# Patient Record
Sex: FEMALE | Race: WHITE | NOT HISPANIC OR LATINO | URBAN - METROPOLITAN AREA
[De-identification: names, ages, dates, MRNs, and addresses within clinical notes are randomized per-mention and may not be internally consistent; named-entity substitution may affect disease eponyms.]

---

## 2023-03-22 ENCOUNTER — OFFICE VISIT (OUTPATIENT)
Dept: FAMILY MEDICINE CLINIC | Facility: CLINIC | Age: 28
End: 2023-03-22
Payer: COMMERCIAL

## 2023-03-22 VITALS
SYSTOLIC BLOOD PRESSURE: 90 MMHG | HEIGHT: 67 IN | BODY MASS INDEX: 34.06 KG/M2 | WEIGHT: 217 LBS | TEMPERATURE: 97.1 F | DIASTOLIC BLOOD PRESSURE: 64 MMHG | HEART RATE: 116 BPM | OXYGEN SATURATION: 99 %

## 2023-03-22 DIAGNOSIS — F32.A ANXIETY AND DEPRESSION: ICD-10-CM

## 2023-03-22 DIAGNOSIS — Z13.6 SCREENING FOR ISCHEMIC HEART DISEASE: ICD-10-CM

## 2023-03-22 DIAGNOSIS — E55.9 VITAMIN D DEFICIENCY: ICD-10-CM

## 2023-03-22 DIAGNOSIS — F41.9 ANXIETY AND DEPRESSION: ICD-10-CM

## 2023-03-22 DIAGNOSIS — Z00.00 ENCOUNTER FOR HEALTH MAINTENANCE EXAMINATION IN ADULT: Primary | ICD-10-CM

## 2023-03-22 DIAGNOSIS — E03.9 HYPOTHYROIDISM, UNSPECIFIED TYPE: ICD-10-CM

## 2023-03-22 DIAGNOSIS — R53.83 OTHER FATIGUE: ICD-10-CM

## 2023-03-22 RX ORDER — CETIRIZINE HYDROCHLORIDE, PSEUDOEPHEDRINE HYDROCHLORIDE 5; 120 MG/1; MG/1
1 TABLET, FILM COATED, EXTENDED RELEASE ORAL
COMMUNITY

## 2023-03-22 RX ORDER — LEVOTHYROXINE SODIUM 0.07 MG/1
75 TABLET ORAL DAILY
COMMUNITY
End: 2023-03-23 | Stop reason: SDUPTHER

## 2023-03-22 RX ORDER — NORGESTIMATE AND ETHINYL ESTRADIOL 7DAYSX3 28
1 KIT ORAL DAILY
COMMUNITY
Start: 2023-02-27

## 2023-03-22 RX ORDER — SPIRONOLACTONE 100 MG/1
TABLET, FILM COATED ORAL
COMMUNITY
Start: 2023-01-24

## 2023-03-23 PROBLEM — Z00.00 ENCOUNTER FOR HEALTH MAINTENANCE EXAMINATION IN ADULT: Status: ACTIVE | Noted: 2023-03-23

## 2023-03-23 LAB
25(OH)D3+25(OH)D2 SERPL-MCNC: 75 NG/ML (ref 30–100)
ALBUMIN SERPL-MCNC: 4.2 G/DL (ref 3.5–5.2)
ALBUMIN/GLOB SERPL: 1.5 G/DL
ALP SERPL-CCNC: 48 U/L (ref 39–117)
ALT SERPL-CCNC: 17 U/L (ref 1–33)
AST SERPL-CCNC: 19 U/L (ref 1–32)
BASOPHILS # BLD AUTO: 0.09 10*3/MM3 (ref 0–0.2)
BASOPHILS NFR BLD AUTO: 1 % (ref 0–1.5)
BILIRUB SERPL-MCNC: <0.2 MG/DL (ref 0–1.2)
BUN SERPL-MCNC: 10 MG/DL (ref 6–20)
BUN/CREAT SERPL: 12.8 (ref 7–25)
CALCIUM SERPL-MCNC: 9.9 MG/DL (ref 8.6–10.5)
CHLORIDE SERPL-SCNC: 101 MMOL/L (ref 98–107)
CHOLEST SERPL-MCNC: 196 MG/DL (ref 0–200)
CO2 SERPL-SCNC: 26.7 MMOL/L (ref 22–29)
CREAT SERPL-MCNC: 0.78 MG/DL (ref 0.57–1)
EGFRCR SERPLBLD CKD-EPI 2021: 107.6 ML/MIN/1.73
EOSINOPHIL # BLD AUTO: 0.36 10*3/MM3 (ref 0–0.4)
EOSINOPHIL NFR BLD AUTO: 4 % (ref 0.3–6.2)
ERYTHROCYTE [DISTWIDTH] IN BLOOD BY AUTOMATED COUNT: 11.7 % (ref 12.3–15.4)
FERRITIN SERPL-MCNC: 30.6 NG/ML (ref 13–150)
FOLATE SERPL-MCNC: >20 NG/ML (ref 4.78–24.2)
GLOBULIN SER CALC-MCNC: 2.8 GM/DL
GLUCOSE SERPL-MCNC: 101 MG/DL (ref 65–99)
HBA1C MFR BLD: 5.1 % (ref 4.8–5.6)
HCT VFR BLD AUTO: 35.6 % (ref 34–46.6)
HDLC SERPL-MCNC: 84 MG/DL (ref 40–60)
HGB BLD-MCNC: 12.4 G/DL (ref 12–15.9)
IMM GRANULOCYTES # BLD AUTO: 0.03 10*3/MM3 (ref 0–0.05)
IMM GRANULOCYTES NFR BLD AUTO: 0.3 % (ref 0–0.5)
IRON SATN MFR SERPL: 11 % (ref 20–50)
IRON SERPL-MCNC: 66 MCG/DL (ref 37–145)
LDLC SERPL CALC-MCNC: 88 MG/DL (ref 0–100)
LYMPHOCYTES # BLD AUTO: 3.12 10*3/MM3 (ref 0.7–3.1)
LYMPHOCYTES NFR BLD AUTO: 34.6 % (ref 19.6–45.3)
MCH RBC QN AUTO: 30.4 PG (ref 26.6–33)
MCHC RBC AUTO-ENTMCNC: 34.8 G/DL (ref 31.5–35.7)
MCV RBC AUTO: 87.3 FL (ref 79–97)
MONOCYTES # BLD AUTO: 0.67 10*3/MM3 (ref 0.1–0.9)
MONOCYTES NFR BLD AUTO: 7.4 % (ref 5–12)
NEUTROPHILS # BLD AUTO: 4.75 10*3/MM3 (ref 1.7–7)
NEUTROPHILS NFR BLD AUTO: 52.7 % (ref 42.7–76)
NRBC BLD AUTO-RTO: 0 /100 WBC (ref 0–0.2)
PLATELET # BLD AUTO: 324 10*3/MM3 (ref 140–450)
POTASSIUM SERPL-SCNC: 4.5 MMOL/L (ref 3.5–5.2)
PROT SERPL-MCNC: 7 G/DL (ref 6–8.5)
RBC # BLD AUTO: 4.08 10*6/MM3 (ref 3.77–5.28)
SODIUM SERPL-SCNC: 139 MMOL/L (ref 136–145)
TIBC SERPL-MCNC: 592 MCG/DL
TRIGL SERPL-MCNC: 141 MG/DL (ref 0–150)
TSH SERPL DL<=0.005 MIU/L-ACNC: 3.59 UIU/ML (ref 0.27–4.2)
UIBC SERPL-MCNC: 526 MCG/DL (ref 112–346)
VIT B12 SERPL-MCNC: 1832 PG/ML (ref 211–946)
VLDLC SERPL CALC-MCNC: 24 MG/DL (ref 5–40)
WBC # BLD AUTO: 9.02 10*3/MM3 (ref 3.4–10.8)

## 2023-03-23 RX ORDER — LEVOTHYROXINE SODIUM 0.07 MG/1
75 TABLET ORAL
Qty: 90 TABLET | Refills: 3 | Status: SHIPPED | OUTPATIENT
Start: 2023-03-23

## 2023-03-23 NOTE — ASSESSMENT & PLAN NOTE
Colonoscopy: avg risk, recommend to start screening at 46yo  Cervical Cancer Screening: UTD- follows with GYN  Mammogram: avg risk, recommend to start screening between 40-49yo  LDCT: never smoker  DEXA: indicated at age 65    Immunizations: UTD  Labs: ordered today  The ASCVD Risk score (Tanya QUIÑONES, et al., 2019) failed to calculate for the following reasons:    The 2019 ASCVD risk score is only valid for ages 40 to 79        Patient was counseled in regards to maintaining a healthy lifestyle, rich in whole grains, fruits and vegetables. Limit high saturated fats and processed sugars. Maintain an active lifestyle to promote overall health and well being.

## 2023-03-23 NOTE — PROGRESS NOTES
"Chief Complaint  Establish Care (Pt has been feeling extremely exhausted ) and Med Refill (All meds )    Subjective        Gloria Mcnamara presents to [facility]  History of Present Illness  27yoF who presents to establish care today.    She is a . Currently doing masters in counseling as well.     Good sleep hygiene, exercises regularly, does not drink alcohol regularly. Stays well hydrated.    Diagnosed with hypothyroidism and has been on synthroid. Has not had TSH checked recently.   On Zoloft for anxiety. Well controlled at current time. Takes at night.      Objective   Vital Signs:  BP 90/64 (BP Location: Right arm, Patient Position: Sitting, Cuff Size: Large Adult)   Pulse 116   Temp 97.1 °F (36.2 °C) (Infrared)   Ht 170.2 cm (67\")   Wt 98.4 kg (217 lb)   SpO2 99%   BMI 33.99 kg/m²   Estimated body mass index is 33.99 kg/m² as calculated from the following:    Height as of this encounter: 170.2 cm (67\").    Weight as of this encounter: 98.4 kg (217 lb).       BMI is >= 30 and <35. (Class 1 Obesity). The following options were offered after discussion;: exercise counseling/recommendations and nutrition counseling/recommendations      Physical Exam  Constitutional:       General: She is not in acute distress.  Eyes:      Conjunctiva/sclera: Conjunctivae normal.   Cardiovascular:      Rate and Rhythm: Normal rate and regular rhythm.   Pulmonary:      Effort: Pulmonary effort is normal. No respiratory distress.      Breath sounds: Normal breath sounds.   Skin:     General: Skin is warm and dry.   Neurological:      Mental Status: She is alert and oriented to person, place, and time.   Psychiatric:         Mood and Affect: Mood normal.         Behavior: Behavior normal.        Result Review :  The following data was reviewed by: Michelle Duke MD on 03/22/2023:  Common labs    Common Labs 3/22/23 3/22/23 3/22/23 3/22/23    1459 1459 1459 1459   Glucose 101 (A)      BUN " 10      Creatinine 0.78      Sodium 139      Potassium 4.5      Chloride 101      Calcium 9.9      Total Protein 7.0      Albumin 4.2      Total Bilirubin <0.2      Alkaline Phosphatase 48      AST (SGOT) 19      ALT (SGPT) 17      WBC    9.02   Hemoglobin    12.4   Hematocrit    35.6   Platelets    324   Total Cholesterol   196    Triglycerides   141    HDL Cholesterol   84 (A)    LDL Cholesterol    88    Hemoglobin A1C  5.10     (A) Abnormal value       Comments are available for some flowsheets but are not being displayed.           Data reviewed: none             Assessment and Plan   Diagnoses and all orders for this visit:    1. Encounter for health maintenance examination in adult (Primary)  Assessment & Plan:  Colonoscopy: avg risk, recommend to start screening at 44yo  Cervical Cancer Screening: UTD- follows with GYN  Mammogram: avg risk, recommend to start screening between 40-51yo  LDCT: never smoker  DEXA: indicated at age 65    Immunizations: UTD  Labs: ordered today  The ASCVD Risk score (Tanya QUIÑONES, et al., 2019) failed to calculate for the following reasons:    The 2019 ASCVD risk score is only valid for ages 40 to 79        Patient was counseled in regards to maintaining a healthy lifestyle, rich in whole grains, fruits and vegetables. Limit high saturated fats and processed sugars. Maintain an active lifestyle to promote overall health and well being.         2. Other fatigue  -     CBC Auto Differential  -     Iron and TIBC  -     Ferritin  -     Vitamin B12  -     Folate    3. Hypothyroidism, unspecified type  -     TSH  -     levothyroxine (SYNTHROID, LEVOTHROID) 75 MCG tablet; Take 1 tablet by mouth Every Morning.  Dispense: 90 tablet; Refill: 3    4. Screening for ischemic heart disease  -     Comprehensive Metabolic Panel  -     ORDER: Hemoglobin A1c  -     Lipid Panel    5. Vitamin D deficiency  -     Vitamin D,25-Hydroxy    6. Anxiety and depression  -     sertraline (ZOLOFT) 50 MG tablet;  Take 1 tablet by mouth Daily.  Dispense: 90 tablet; Refill: 3    Other orders  -     CBC & Differential           Follow Up   No follow-ups on file.  Patient was given instructions and counseling regarding her condition or for health maintenance advice. Please see specific information pulled into the AVS if appropriate.

## 2023-03-23 NOTE — PROGRESS NOTES
Please call patient with results:    Labs overall  look good. Iron levels are low-normal. Continue all current medications and vitamins.     Recommend repeat labs in 1yr.

## 2023-09-18 ENCOUNTER — PATIENT MESSAGE (OUTPATIENT)
Dept: FAMILY MEDICINE CLINIC | Facility: CLINIC | Age: 28
End: 2023-09-18
Payer: COMMERCIAL

## 2023-09-18 DIAGNOSIS — F32.A ANXIETY AND DEPRESSION: ICD-10-CM

## 2023-09-18 DIAGNOSIS — F41.9 ANXIETY AND DEPRESSION: ICD-10-CM

## 2023-09-18 DIAGNOSIS — E03.9 HYPOTHYROIDISM, UNSPECIFIED TYPE: ICD-10-CM

## 2023-09-18 RX ORDER — LEVOTHYROXINE SODIUM 0.07 MG/1
75 TABLET ORAL
Qty: 90 TABLET | Refills: 3 | Status: SHIPPED | OUTPATIENT
Start: 2023-09-18

## 2023-09-18 NOTE — TELEPHONE ENCOUNTER
Please update pt's demographics       Rx Refill Note  Requested Prescriptions     Pending Prescriptions Disp Refills    levothyroxine (SYNTHROID, LEVOTHROID) 75 MCG tablet 90 tablet 3     Sig: Take 1 tablet by mouth Every Morning.    sertraline (ZOLOFT) 50 MG tablet 90 tablet 3     Sig: Take 1 tablet by mouth Daily.      Last office visit with prescribing clinician: 3/22/2023   Last telemedicine visit with prescribing clinician: Visit date not found   Next office visit with prescribing clinician: 3/26/2024                         Would you like a call back once the refill request has been completed: [] Yes [] No    If the office needs to give you a call back, can they leave a voicemail: [] Yes [] No    Pearl Raza CMA/LMR  09/18/23, 13:30 EDT

## 2023-09-18 NOTE — TELEPHONE ENCOUNTER
From: Gloria Mcnamara  To: Michelle Duke  Sent: 9/18/2023 1:21 PM EDT  Subject: New Prescriptions/Insurance has changed    I am on a new insurance plan as of 09/01/23 and need to request a 90 day mail order prescription for my levothyroxine and sertraline. My new mail order pharmacy is [facility]. Also my home address has changed to [address] [place] [address]. Can you please send in new prescriptions for these? Thanks

## 2024-03-26 ENCOUNTER — OFFICE VISIT (OUTPATIENT)
Dept: FAMILY MEDICINE CLINIC | Facility: CLINIC | Age: 29
End: 2024-03-26
Payer: COMMERCIAL

## 2024-03-26 VITALS
HEIGHT: 67 IN | TEMPERATURE: 97.1 F | SYSTOLIC BLOOD PRESSURE: 104 MMHG | DIASTOLIC BLOOD PRESSURE: 70 MMHG | BODY MASS INDEX: 34.37 KG/M2 | OXYGEN SATURATION: 97 % | HEART RATE: 109 BPM | WEIGHT: 219 LBS

## 2024-03-26 DIAGNOSIS — Z00.00 ENCOUNTER FOR HEALTH MAINTENANCE EXAMINATION IN ADULT: Primary | ICD-10-CM

## 2024-03-26 DIAGNOSIS — E66.9 OBESITY (BMI 30.0-34.9): ICD-10-CM

## 2024-03-26 DIAGNOSIS — Z13.6 SCREENING FOR ISCHEMIC HEART DISEASE: ICD-10-CM

## 2024-03-26 DIAGNOSIS — Z13.0 SCREENING FOR DEFICIENCY ANEMIA: ICD-10-CM

## 2024-03-26 DIAGNOSIS — E55.9 VITAMIN D DEFICIENCY: ICD-10-CM

## 2024-03-26 PROCEDURE — 99395 PREV VISIT EST AGE 18-39: CPT | Performed by: STUDENT IN AN ORGANIZED HEALTH CARE EDUCATION/TRAINING PROGRAM

## 2024-03-26 NOTE — ASSESSMENT & PLAN NOTE
Colonoscopy: avg risk, recommend to start screening at 44yo  Cervical Cancer Screening: UTD- follows with GYN  Mammogram: avg risk, recommend to start screening between 40-49yo  LDCT: never smoker  DEXA: indicated at age 65    Immunizations: UTD  Labs: ordered today  The ASCVD Risk score (Tanya QUIÑONES, et al., 2019) failed to calculate for the following reasons:    The 2019 ASCVD risk score is only valid for ages 40 to 79        Patient was counseled in regards to maintaining a healthy lifestyle, rich in whole grains, fruits and vegetables. Limit high saturated fats and processed sugars. Maintain an active lifestyle to promote overall health and well being.

## 2024-03-26 NOTE — PROGRESS NOTES
"Chief Complaint  Annual Exam (Pt states she feels as though her heart is racing randomly about 2/3 times a week (no other sx)/-refill all meds )    Subjective        Gloria Mcnamara presents to [facility]  History of Present Illness  28yoF with hypothyroidism, anxiety who presents for annual exam.    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    Diagnosed with hypothyroidism and has been on synthroid - stable dose.  On Zoloft for anxiety.    Follows with GYN.       Objective   Vital Signs:  /70 (BP Location: Right arm, Patient Position: Sitting, Cuff Size: Adult)   Pulse 109   Temp 97.1 °F (36.2 °C) (Infrared)   Ht 170.2 cm (67.01\")   Wt 99.3 kg (219 lb)   SpO2 97%   BMI 34.29 kg/m²   Estimated body mass index is 34.29 kg/m² as calculated from the following:    Height as of this encounter: 170.2 cm (67.01\").    Weight as of this encounter: 99.3 kg (219 lb).       BMI is >= 30 and <35. (Class 1 Obesity). The following options were offered after discussion;: exercise counseling/recommendations and nutrition counseling/recommendations      Physical Exam  Constitutional:       General: She is not in acute distress.  Eyes:      Conjunctiva/sclera: Conjunctivae normal.   Neck:      Comments: No thyroid nodules noted.  Cardiovascular:      Rate and Rhythm: Normal rate and regular rhythm.   Pulmonary:      Effort: Pulmonary effort is normal. No respiratory distress.      Breath sounds: Normal breath sounds.   Abdominal:      Palpations: Abdomen is soft.      Tenderness: There is no abdominal tenderness.   Musculoskeletal:      Cervical back: Neck supple. No tenderness.   Lymphadenopathy:      Cervical: No cervical adenopathy.   Skin:     General: Skin is warm and dry.   Neurological:      Mental Status: She is alert and oriented to person, place, and time.   Psychiatric:         Mood and Affect: Mood normal.         Behavior: Behavior normal.        Result Review :    The following " data was reviewed by: Michelle Duke MD on 03/26/2024:    Data reviewed : none             Assessment and Plan     Diagnoses and all orders for this visit:    1. Encounter for health maintenance examination in adult (Primary)  Assessment & Plan:  Colonoscopy: avg risk, recommend to start screening at 44yo  Cervical Cancer Screening: UTD- follows with GYN  Mammogram: avg risk, recommend to start screening between 40-49yo  LDCT: never smoker  DEXA: indicated at age 65    Immunizations: UTD  Labs: ordered today  The ASCVD Risk score (Tanya QUIÑONES, et al., 2019) failed to calculate for the following reasons:    The 2019 ASCVD risk score is only valid for ages 40 to 79        Patient was counseled in regards to maintaining a healthy lifestyle, rich in whole grains, fruits and vegetables. Limit high saturated fats and processed sugars. Maintain an active lifestyle to promote overall health and well being.         2. Obesity (BMI 30.0-34.9)    3. Screening for deficiency anemia  -     CBC Auto Differential  -     Vitamin B12  -     Folate  -     Iron and TIBC  -     Ferritin    4. Screening for ischemic heart disease  -     Comprehensive Metabolic Panel  -     ORDER: Hemoglobin A1c  -     TSH    5. Vitamin D deficiency  -     Vitamin D,25-Hydroxy             Follow Up     No follow-ups on file.  Patient was given instructions and counseling regarding her condition or for health maintenance advice. Please see specific information pulled into the AVS if appropriate.

## 2024-03-27 LAB
25(OH)D3+25(OH)D2 SERPL-MCNC: 51.3 NG/ML (ref 30–100)
ALBUMIN SERPL-MCNC: 4.2 G/DL (ref 3.5–5.2)
ALBUMIN/GLOB SERPL: 1.4 G/DL
ALP SERPL-CCNC: 59 U/L (ref 39–117)
ALT SERPL-CCNC: 30 U/L (ref 1–33)
AST SERPL-CCNC: 26 U/L (ref 1–32)
BASOPHILS # BLD AUTO: 0.1 10*3/MM3 (ref 0–0.2)
BASOPHILS NFR BLD AUTO: 1.2 % (ref 0–1.5)
BILIRUB SERPL-MCNC: 0.3 MG/DL (ref 0–1.2)
BUN SERPL-MCNC: 11 MG/DL (ref 6–20)
BUN/CREAT SERPL: 13.1 (ref 7–25)
CALCIUM SERPL-MCNC: 10.3 MG/DL (ref 8.6–10.5)
CHLORIDE SERPL-SCNC: 100 MMOL/L (ref 98–107)
CO2 SERPL-SCNC: 24.2 MMOL/L (ref 22–29)
CREAT SERPL-MCNC: 0.84 MG/DL (ref 0.57–1)
EGFRCR SERPLBLD CKD-EPI 2021: 97.8 ML/MIN/1.73
EOSINOPHIL # BLD AUTO: 0.25 10*3/MM3 (ref 0–0.4)
EOSINOPHIL NFR BLD AUTO: 2.9 % (ref 0.3–6.2)
ERYTHROCYTE [DISTWIDTH] IN BLOOD BY AUTOMATED COUNT: 12.7 % (ref 12.3–15.4)
FERRITIN SERPL-MCNC: 22.3 NG/ML (ref 13–150)
FOLATE SERPL-MCNC: >20 NG/ML (ref 4.78–24.2)
GLOBULIN SER CALC-MCNC: 3 GM/DL
GLUCOSE SERPL-MCNC: 92 MG/DL (ref 65–99)
HBA1C MFR BLD: 5.4 % (ref 4.8–5.6)
HCT VFR BLD AUTO: 38.8 % (ref 34–46.6)
HGB BLD-MCNC: 12.8 G/DL (ref 12–15.9)
IMM GRANULOCYTES # BLD AUTO: 0.03 10*3/MM3 (ref 0–0.05)
IMM GRANULOCYTES NFR BLD AUTO: 0.4 % (ref 0–0.5)
IRON SATN MFR SERPL: 18 % (ref 20–50)
IRON SERPL-MCNC: 113 MCG/DL (ref 37–145)
LYMPHOCYTES # BLD AUTO: 1.83 10*3/MM3 (ref 0.7–3.1)
LYMPHOCYTES NFR BLD AUTO: 21.4 % (ref 19.6–45.3)
MCH RBC QN AUTO: 27.8 PG (ref 26.6–33)
MCHC RBC AUTO-ENTMCNC: 33 G/DL (ref 31.5–35.7)
MCV RBC AUTO: 84.3 FL (ref 79–97)
MONOCYTES # BLD AUTO: 0.71 10*3/MM3 (ref 0.1–0.9)
MONOCYTES NFR BLD AUTO: 8.3 % (ref 5–12)
NEUTROPHILS # BLD AUTO: 5.62 10*3/MM3 (ref 1.7–7)
NEUTROPHILS NFR BLD AUTO: 65.8 % (ref 42.7–76)
NRBC BLD AUTO-RTO: 0 /100 WBC (ref 0–0.2)
PLATELET # BLD AUTO: 382 10*3/MM3 (ref 140–450)
POTASSIUM SERPL-SCNC: 4.9 MMOL/L (ref 3.5–5.2)
PROT SERPL-MCNC: 7.2 G/DL (ref 6–8.5)
RBC # BLD AUTO: 4.6 10*6/MM3 (ref 3.77–5.28)
SODIUM SERPL-SCNC: 136 MMOL/L (ref 136–145)
TIBC SERPL-MCNC: 612 MCG/DL
TSH SERPL DL<=0.005 MIU/L-ACNC: 1.49 UIU/ML (ref 0.27–4.2)
UIBC SERPL-MCNC: 499 MCG/DL (ref 112–346)
VIT B12 SERPL-MCNC: 818 PG/ML (ref 211–946)
WBC # BLD AUTO: 8.54 10*3/MM3 (ref 3.4–10.8)

## 2024-07-03 DIAGNOSIS — F41.9 ANXIETY AND DEPRESSION: ICD-10-CM

## 2024-07-03 DIAGNOSIS — F32.A ANXIETY AND DEPRESSION: ICD-10-CM

## 2024-07-03 DIAGNOSIS — E03.9 HYPOTHYROIDISM, UNSPECIFIED TYPE: ICD-10-CM

## 2024-07-03 RX ORDER — LEVOTHYROXINE SODIUM 75 MCG
75 TABLET ORAL EVERY MORNING
Qty: 90 TABLET | Refills: 3 | Status: SHIPPED | OUTPATIENT
Start: 2024-07-03

## 2025-03-28 ENCOUNTER — OFFICE VISIT (OUTPATIENT)
Dept: FAMILY MEDICINE CLINIC | Facility: CLINIC | Age: 30
End: 2025-03-28
Payer: COMMERCIAL

## 2025-03-28 VITALS
HEART RATE: 80 BPM | WEIGHT: 217 LBS | TEMPERATURE: 98 F | DIASTOLIC BLOOD PRESSURE: 68 MMHG | HEIGHT: 67 IN | SYSTOLIC BLOOD PRESSURE: 112 MMHG | BODY MASS INDEX: 34.06 KG/M2 | OXYGEN SATURATION: 100 %

## 2025-03-28 DIAGNOSIS — Z00.00 ENCOUNTER FOR HEALTH MAINTENANCE EXAMINATION IN ADULT: Primary | ICD-10-CM

## 2025-03-28 PROCEDURE — 99395 PREV VISIT EST AGE 18-39: CPT | Performed by: STUDENT IN AN ORGANIZED HEALTH CARE EDUCATION/TRAINING PROGRAM

## 2025-03-28 NOTE — PROGRESS NOTES
"Chief Complaint  Annual Exam (Yes to kay. Pt has no cocnerns.)    Subjective        Gloria Mcnamara presents to [facility]  History of Present Illness  29yoF with hypothyroidism, anxiety who presents for annual exam.    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    Diagnosed with hypothyroidism and has been on synthroid - stable dose.  On Zoloft for anxiety.    Follows with GYN.          She is currently on a regimen of Synthroid and B12 tablets. Additionally, she takes a multivitamin supplement that contains iron. Her diet includes red meat, and she utilizes a cast iron skillet for cooking. She is amenorrheic and has an upcoming appointment scheduled for 04/2025. She is also on spironolactone, prescribed by her dermatologist.     She experiences severe allergies, which are currently manifesting as drainage. She describes her throat as itchy, scratchy, and slightly sore. She manages these symptoms with Zyrtec but does not use Flonase.    MEDICATIONS  Synthroid, B12 tablet, multivitamin with iron, spironolactone, Zyrtec    IMMUNIZATIONS  She is up to date on all her vaccines.       Objective   Vital Signs:  /68   Pulse 80   Temp 98 °F (36.7 °C)   Ht 170.2 cm (67\")   Wt 98.4 kg (217 lb)   SpO2 100%   BMI 33.99 kg/m²   Estimated body mass index is 33.99 kg/m² as calculated from the following:    Height as of this encounter: 170.2 cm (67\").    Weight as of this encounter: 98.4 kg (217 lb).        Physical Exam  Constitutional:       General: She is not in acute distress.  HENT:      Head: Normocephalic and atraumatic.      Mouth/Throat:      Mouth: Mucous membranes are moist.      Pharynx: Posterior oropharyngeal erythema present. No oropharyngeal exudate.   Eyes:      General: No scleral icterus.     Conjunctiva/sclera: Conjunctivae normal.   Cardiovascular:      Rate and Rhythm: Normal rate and regular rhythm.   Pulmonary:      Effort: Pulmonary effort is normal. No " respiratory distress.   Skin:     General: Skin is warm and dry.   Neurological:      Mental Status: She is alert and oriented to person, place, and time.   Psychiatric:         Mood and Affect: Mood normal.         Behavior: Behavior normal.        Result Review :  The following data was reviewed by: Michelle Duke MD on 03/28/2025:    Data reviewed : none           Assessment and Plan   Diagnoses and all orders for this visit:    1. Encounter for health maintenance examination in adult (Primary)  Assessment & Plan:  Colonoscopy: avg risk, recommend to start screening at 44yo  Cervical Cancer Screening: UTD- follows with GYN  Mammogram: avg risk, recommend to start screening between 39yo  LDCT: never smoker  DEXA: indicated at age 65    Immunizations: UTD  Labs: reviewed today  The ASCVD Risk score (Tanya DK, et al., 2019) failed to calculate for the following reasons:    The 2019 ASCVD risk score is only valid for ages 40 to 79        Patient was counseled in regards to maintaining a healthy lifestyle, rich in whole grains, fruits and vegetables. Limit high saturated fats and processed sugars. Maintain an active lifestyle to promote overall health and well being.                            Follow Up   No follow-ups on file.  Patient was given instructions and counseling regarding her condition or for health maintenance advice. Please see specific information pulled into the AVS if appropriate.     Patient or patient representative verbalized consent for the use of Ambient Listening during the visit with  Michelle Duke MD for chart documentation. 3/28/2025  08:42 EDT

## 2025-03-28 NOTE — ASSESSMENT & PLAN NOTE
Colonoscopy: avg risk, recommend to start screening at 44yo  Cervical Cancer Screening: UTD- follows with GYN  Mammogram: avg risk, recommend to start screening between 41yo  LDCT: never smoker  DEXA: indicated at age 65    Immunizations: UTD  Labs: reviewed today  The ASCVD Risk score (Tanya QUIÑONES, et al., 2019) failed to calculate for the following reasons:    The 2019 ASCVD risk score is only valid for ages 40 to 79        Patient was counseled in regards to maintaining a healthy lifestyle, rich in whole grains, fruits and vegetables. Limit high saturated fats and processed sugars. Maintain an active lifestyle to promote overall health and well being.

## 2025-04-17 DIAGNOSIS — F32.A ANXIETY AND DEPRESSION: ICD-10-CM

## 2025-04-17 DIAGNOSIS — E03.9 HYPOTHYROIDISM, UNSPECIFIED TYPE: ICD-10-CM

## 2025-04-17 DIAGNOSIS — F41.9 ANXIETY AND DEPRESSION: ICD-10-CM

## 2025-04-18 RX ORDER — LEVOTHYROXINE SODIUM 75 MCG
75 TABLET ORAL EVERY MORNING
Qty: 90 TABLET | Refills: 3 | Status: SHIPPED | OUTPATIENT
Start: 2025-04-18

## 2025-04-18 NOTE — TELEPHONE ENCOUNTER
Rx Refill Note  Requested Prescriptions     Pending Prescriptions Disp Refills    Synthroid 75 MCG tablet [Pharmacy Med Name: SYNTHROID TAB 75MCG] 90 tablet 3     Sig: TAKE 1 TABLET EVERY MORNING     Signed Prescriptions Disp Refills    sertraline (ZOLOFT) 50 MG tablet 90 tablet 3     Sig: TAKE 1 TABLET DAILY     Authorizing Provider: LUNA GRAVES     Ordering User: LEAH HUANG      Last office visit with prescribing clinician: 3/28/2025   Last telemedicine visit with prescribing clinician: Visit date not found   Next office visit with prescribing clinician: 3/31/2026                         Would you like a call back once the refill request has been completed: [] Yes [] No    If the office needs to give you a call back, can they leave a voicemail: [] Yes [] No    Leah Huang MA  04/18/25, 07:10 EDT

## 2025-06-13 ENCOUNTER — OFFICE VISIT (OUTPATIENT)
Dept: FAMILY MEDICINE CLINIC | Facility: CLINIC | Age: 30
End: 2025-06-13
Payer: COMMERCIAL

## 2025-06-13 VITALS
OXYGEN SATURATION: 100 % | DIASTOLIC BLOOD PRESSURE: 68 MMHG | SYSTOLIC BLOOD PRESSURE: 114 MMHG | HEART RATE: 74 BPM | WEIGHT: 216 LBS | BODY MASS INDEX: 33.9 KG/M2 | TEMPERATURE: 98.2 F | HEIGHT: 67 IN

## 2025-06-13 DIAGNOSIS — Z13.0 SCREENING FOR DEFICIENCY ANEMIA: ICD-10-CM

## 2025-06-13 DIAGNOSIS — Z13.6 SCREENING FOR ISCHEMIC HEART DISEASE: ICD-10-CM

## 2025-06-13 DIAGNOSIS — R11.0 NAUSEA: Primary | ICD-10-CM

## 2025-06-13 DIAGNOSIS — K21.9 GASTROESOPHAGEAL REFLUX DISEASE, UNSPECIFIED WHETHER ESOPHAGITIS PRESENT: ICD-10-CM

## 2025-06-13 PROCEDURE — 99214 OFFICE O/P EST MOD 30 MIN: CPT | Performed by: STUDENT IN AN ORGANIZED HEALTH CARE EDUCATION/TRAINING PROGRAM

## 2025-06-13 RX ORDER — OMEPRAZOLE 40 MG/1
40 CAPSULE, DELAYED RELEASE ORAL DAILY
Qty: 30 CAPSULE | Refills: 0 | Status: SHIPPED | OUTPATIENT
Start: 2025-06-13

## 2025-06-13 NOTE — PROGRESS NOTES
"Chief Complaint  Dizziness and Nausea    Subjective        Gloria Mcnamara presents to [facility]  History of Present Illness  30yoF with hypothyroidism, anxiety who presents for episodic nausea for the last few months.         She has been experiencing intermittent episodes of nausea for approximately 2 months, with no identifiable trigger. These episodes can occur at any time, regardless of her eating schedule, and are often accompanied by mild dizziness, but not to the extent of vertigo. The duration of these episodes varies, with some lasting up to 10 or 15 minutes. She has not experienced any vomiting during these episodes. She reports that her acid reflux symptoms have been worsening, although they do not coincide with the nausea directly.  She is not currently on any regular medication for her reflux. She manages these episodes by lying down, practicing controlled breathing, and hydrating.    She has been using Zyrtec-D and Flonase for allergy management, which she finds beneficial. However, she has not used Flonase in the past 2 to 3 days.    She confirms that there has been no change in her birth control regimen since her last appointment in 04/2025. She takes her birth control pill in the morning.       Objective   Vital Signs:  /68   Pulse 74   Temp 98.2 °F (36.8 °C)   Ht 170.2 cm (67\")   Wt 98 kg (216 lb)   SpO2 100%   BMI 33.83 kg/m²   Estimated body mass index is 33.83 kg/m² as calculated from the following:    Height as of this encounter: 170.2 cm (67\").    Weight as of this encounter: 98 kg (216 lb).      Physical Exam  Constitutional:       General: She is not in acute distress.  HENT:      Head: Normocephalic and atraumatic.      Right Ear: Tympanic membrane normal.      Left Ear: Tympanic membrane normal.      Nose: Rhinorrhea present. No congestion.      Mouth/Throat:      Mouth: Mucous membranes are moist.      Pharynx: Posterior oropharyngeal erythema " present. No oropharyngeal exudate.   Eyes:      Conjunctiva/sclera: Conjunctivae normal.   Cardiovascular:      Rate and Rhythm: Normal rate and regular rhythm.   Pulmonary:      Effort: Pulmonary effort is normal. No respiratory distress.   Abdominal:      Palpations: Abdomen is soft.      Tenderness: There is no abdominal tenderness. There is no guarding or rebound.   Skin:     General: Skin is warm and dry.   Neurological:      Mental Status: She is alert and oriented to person, place, and time.   Psychiatric:         Mood and Affect: Mood normal.         Behavior: Behavior normal.        Result Review :  The following data was reviewed by: Michelle Duke MD on 06/13/2025:    Data reviewed : none          Assessment and Plan   Diagnoses and all orders for this visit:    1. Nausea (Primary)    2. Screening for deficiency anemia  -     CBC Auto Differential    3. Screening for ischemic heart disease  -     Comprehensive Metabolic Panel  -     ORDER: Hemoglobin A1c  -     TSH  -     Lipid Panel    4. Gastroesophageal reflux disease, unspecified whether esophagitis present  -     omeprazole (priLOSEC) 40 MG capsule; Take 1 capsule by mouth Daily.  Dispense: 30 capsule; Refill: 0           1. Nausea.  - The etiology of the nausea could be  potentially stemming from medication use, vertigo, inner ear dysfunction due to allergies, or GERD. The possibility of gallbladder involvement cannot be ruled out.  - A comprehensive lab workup will be conducted today, including total bilirubin and alkaline phosphatase levels. If these labs are abnormal, an ultrasound of the liver will be considered.  - A prescription for omeprazole 40 mg, to be taken once daily in the morning, has been provided. She is advised to take this medication for a period of 4 weeks. If there is no improvement in her symptoms within the next 2 weeks, she should inform us so that we can consider increasing her allergy regimen. If the omeprazole proves  effective, she can discontinue its use after 4 weeks. However, if her symptoms recur upon discontinuation, she should resume the medication for a longer duration.    2. Acid reflux.  - She reports worsening acid reflux symptoms.  - Exam benign.  No red flag symptoms.  - A proton pump inhibitor, omeprazole 40 mg, has been prescribed to be taken once daily in the morning for 4 weeks.  - She is advised to monitor her symptoms and report any lack of improvement within 2 weeks.    3. Allergies.  - She uses Zyrtec-D and Flonase as needed for allergy symptoms.  - She is advised to use Flonase regularly to help with potential inner ear dysfunction and postnasal drip that might be contributing to her nausea.  - If symptoms persist despite regular use of Flonase, further evaluation and adjustment of her allergy regimen may be necessary.            Follow Up   No follow-ups on file.  Patient was given instructions and counseling regarding her condition or for health maintenance advice. Please see specific information pulled into the AVS if appropriate.     Patient or patient representative verbalized consent for the use of Ambient Listening during the visit with  Michelle Duke MD for chart documentation. 6/13/2025  12:31 EDT

## 2025-06-14 LAB
ALBUMIN SERPL-MCNC: 4.2 G/DL (ref 3.5–5.2)
ALBUMIN/GLOB SERPL: 1.6 G/DL
ALP SERPL-CCNC: 50 U/L (ref 39–117)
ALT SERPL-CCNC: 19 U/L (ref 1–33)
AST SERPL-CCNC: 24 U/L (ref 1–32)
BASOPHILS # BLD AUTO: 0.06 10*3/MM3 (ref 0–0.2)
BASOPHILS NFR BLD AUTO: 0.8 % (ref 0–1.5)
BILIRUB SERPL-MCNC: 0.3 MG/DL (ref 0–1.2)
BUN SERPL-MCNC: 8 MG/DL (ref 6–20)
BUN/CREAT SERPL: 9.9 (ref 7–25)
CALCIUM SERPL-MCNC: 9.6 MG/DL (ref 8.6–10.5)
CHLORIDE SERPL-SCNC: 102 MMOL/L (ref 98–107)
CHOLEST SERPL-MCNC: 208 MG/DL (ref 0–200)
CO2 SERPL-SCNC: 24.3 MMOL/L (ref 22–29)
CREAT SERPL-MCNC: 0.81 MG/DL (ref 0.57–1)
EGFRCR SERPLBLD CKD-EPI 2021: 100.9 ML/MIN/1.73
EOSINOPHIL # BLD AUTO: 0.33 10*3/MM3 (ref 0–0.4)
EOSINOPHIL NFR BLD AUTO: 4.2 % (ref 0.3–6.2)
ERYTHROCYTE [DISTWIDTH] IN BLOOD BY AUTOMATED COUNT: 13.2 % (ref 12.3–15.4)
GLOBULIN SER CALC-MCNC: 2.6 GM/DL
GLUCOSE SERPL-MCNC: 83 MG/DL (ref 65–99)
HBA1C MFR BLD: 5.3 % (ref 4.8–5.6)
HCT VFR BLD AUTO: 41.2 % (ref 34–46.6)
HDLC SERPL-MCNC: 83 MG/DL (ref 40–60)
HGB BLD-MCNC: 13.3 G/DL (ref 12–15.9)
IMM GRANULOCYTES # BLD AUTO: 0.02 10*3/MM3 (ref 0–0.05)
IMM GRANULOCYTES NFR BLD AUTO: 0.3 % (ref 0–0.5)
LDLC SERPL CALC-MCNC: 107 MG/DL (ref 0–100)
LYMPHOCYTES # BLD AUTO: 2.61 10*3/MM3 (ref 0.7–3.1)
LYMPHOCYTES NFR BLD AUTO: 33.3 % (ref 19.6–45.3)
MCH RBC QN AUTO: 29.4 PG (ref 26.6–33)
MCHC RBC AUTO-ENTMCNC: 32.3 G/DL (ref 31.5–35.7)
MCV RBC AUTO: 90.9 FL (ref 79–97)
MONOCYTES # BLD AUTO: 0.56 10*3/MM3 (ref 0.1–0.9)
MONOCYTES NFR BLD AUTO: 7.1 % (ref 5–12)
NEUTROPHILS # BLD AUTO: 4.26 10*3/MM3 (ref 1.7–7)
NEUTROPHILS NFR BLD AUTO: 54.3 % (ref 42.7–76)
NRBC BLD AUTO-RTO: 0 /100 WBC (ref 0–0.2)
PLATELET # BLD AUTO: 336 10*3/MM3 (ref 140–450)
POTASSIUM SERPL-SCNC: 4.8 MMOL/L (ref 3.5–5.2)
PROT SERPL-MCNC: 6.8 G/DL (ref 6–8.5)
RBC # BLD AUTO: 4.53 10*6/MM3 (ref 3.77–5.28)
SODIUM SERPL-SCNC: 137 MMOL/L (ref 136–145)
TRIGL SERPL-MCNC: 103 MG/DL (ref 0–150)
TSH SERPL DL<=0.005 MIU/L-ACNC: 2.83 UIU/ML (ref 0.27–4.2)
VLDLC SERPL CALC-MCNC: 18 MG/DL (ref 5–40)
WBC # BLD AUTO: 7.84 10*3/MM3 (ref 3.4–10.8)

## 2025-08-05 ENCOUNTER — OFFICE VISIT (OUTPATIENT)
Dept: FAMILY MEDICINE CLINIC | Facility: CLINIC | Age: 30
End: 2025-08-05
Payer: COMMERCIAL

## 2025-08-05 VITALS
DIASTOLIC BLOOD PRESSURE: 80 MMHG | WEIGHT: 214 LBS | SYSTOLIC BLOOD PRESSURE: 118 MMHG | BODY MASS INDEX: 33.59 KG/M2 | HEART RATE: 86 BPM | OXYGEN SATURATION: 99 % | HEIGHT: 67 IN | TEMPERATURE: 98.6 F

## 2025-08-05 DIAGNOSIS — R10.13 DYSPEPSIA: Primary | ICD-10-CM

## 2025-08-05 PROCEDURE — 99214 OFFICE O/P EST MOD 30 MIN: CPT | Performed by: STUDENT IN AN ORGANIZED HEALTH CARE EDUCATION/TRAINING PROGRAM

## 2025-08-05 RX ORDER — PANTOPRAZOLE SODIUM 40 MG/1
40 TABLET, DELAYED RELEASE ORAL DAILY
Qty: 90 TABLET | Refills: 1 | Status: SHIPPED | OUTPATIENT
Start: 2025-08-05

## 2025-08-05 RX ORDER — TRETINOIN 0.25 MG/G
1 CREAM TOPICAL NIGHTLY
COMMUNITY
Start: 2025-07-29

## 2025-08-05 RX ORDER — CLINDAMYCIN PHOSPHATE 10 MG/ML
SOLUTION TOPICAL EVERY 12 HOURS SCHEDULED
COMMUNITY
Start: 2025-07-29

## 2025-08-11 DIAGNOSIS — R10.13 DYSPEPSIA: ICD-10-CM

## 2025-08-19 DIAGNOSIS — R10.13 ABDOMINAL PAIN, EPIGASTRIC: Primary | ICD-10-CM

## 2025-08-20 LAB — UREA BREATH TEST QL: NEGATIVE
